# Patient Record
Sex: FEMALE | Race: WHITE
[De-identification: names, ages, dates, MRNs, and addresses within clinical notes are randomized per-mention and may not be internally consistent; named-entity substitution may affect disease eponyms.]

---

## 2017-04-23 ENCOUNTER — HOSPITAL ENCOUNTER (EMERGENCY)
Dept: HOSPITAL 62 - ER | Age: 37
Discharge: HOME | End: 2017-04-23
Payer: MEDICAID

## 2017-04-23 VITALS — DIASTOLIC BLOOD PRESSURE: 110 MMHG | SYSTOLIC BLOOD PRESSURE: 166 MMHG

## 2017-04-23 DIAGNOSIS — M25.512: ICD-10-CM

## 2017-04-23 DIAGNOSIS — R07.89: Primary | ICD-10-CM

## 2017-04-23 DIAGNOSIS — I10: ICD-10-CM

## 2017-04-23 DIAGNOSIS — L55.9: ICD-10-CM

## 2017-04-23 DIAGNOSIS — M25.511: ICD-10-CM

## 2017-04-23 DIAGNOSIS — M54.89: ICD-10-CM

## 2017-04-23 LAB
ALBUMIN SERPL-MCNC: 4.5 G/DL (ref 3.5–5)
ALP SERPL-CCNC: 66 U/L (ref 38–126)
ALT SERPL-CCNC: 30 U/L (ref 9–52)
ANION GAP SERPL CALC-SCNC: 14 MMOL/L (ref 5–19)
AST SERPL-CCNC: 24 U/L (ref 14–36)
BASOPHILS # BLD AUTO: 0 10^3/UL (ref 0–0.2)
BASOPHILS NFR BLD AUTO: 0.5 % (ref 0–2)
BILIRUB DIRECT SERPL-MCNC: 0.3 MG/DL (ref 0–0.4)
BILIRUB SERPL-MCNC: 0.9 MG/DL (ref 0.2–1.3)
BUN SERPL-MCNC: 9 MG/DL (ref 7–20)
CALCIUM: 9.1 MG/DL (ref 8.4–10.2)
CHLORIDE SERPL-SCNC: 103 MMOL/L (ref 98–107)
CK MB SERPL-MCNC: 0.79 NG/ML (ref ?–4.55)
CK SERPL-CCNC: 79 U/L (ref 30–135)
CO2 SERPL-SCNC: 25 MMOL/L (ref 22–30)
CREAT SERPL-MCNC: 0.55 MG/DL (ref 0.52–1.25)
EOSINOPHIL # BLD AUTO: 0.1 10^3/UL (ref 0–0.6)
EOSINOPHIL NFR BLD AUTO: 1.1 % (ref 0–6)
ERYTHROCYTE [DISTWIDTH] IN BLOOD BY AUTOMATED COUNT: 14 % (ref 11.5–14)
GLUCOSE SERPL-MCNC: 102 MG/DL (ref 75–110)
HCT VFR BLD CALC: 41.6 % (ref 36–47)
HGB BLD-MCNC: 14.4 G/DL (ref 12–15.5)
HGB HCT DIFFERENCE: 1.6
LYMPHOCYTES # BLD AUTO: 2.8 10^3/UL (ref 0.5–4.7)
LYMPHOCYTES NFR BLD AUTO: 27.3 % (ref 13–45)
MCH RBC QN AUTO: 28.8 PG (ref 27–33.4)
MCHC RBC AUTO-ENTMCNC: 34.6 G/DL (ref 32–36)
MCV RBC AUTO: 83 FL (ref 80–97)
MONOCYTES # BLD AUTO: 0.9 10^3/UL (ref 0.1–1.4)
MONOCYTES NFR BLD AUTO: 8.7 % (ref 3–13)
NEUTROPHILS # BLD AUTO: 6.3 10^3/UL (ref 1.7–8.2)
NEUTS SEG NFR BLD AUTO: 62.4 % (ref 42–78)
POTASSIUM SERPL-SCNC: 4.1 MMOL/L (ref 3.6–5)
PROT SERPL-MCNC: 7.6 G/DL (ref 6.3–8.2)
RBC # BLD AUTO: 4.99 10^6/UL (ref 3.72–5.28)
SODIUM SERPL-SCNC: 142.4 MMOL/L (ref 137–145)
TROPONIN I SERPL-MCNC: < 0.012 NG/ML
WBC # BLD AUTO: 10.2 10^3/UL (ref 4–10.5)

## 2017-04-23 PROCEDURE — 85025 COMPLETE CBC W/AUTO DIFF WBC: CPT

## 2017-04-23 PROCEDURE — 71020: CPT

## 2017-04-23 PROCEDURE — 84484 ASSAY OF TROPONIN QUANT: CPT

## 2017-04-23 PROCEDURE — 82553 CREATINE MB FRACTION: CPT

## 2017-04-23 PROCEDURE — 36415 COLL VENOUS BLD VENIPUNCTURE: CPT

## 2017-04-23 PROCEDURE — 93005 ELECTROCARDIOGRAM TRACING: CPT

## 2017-04-23 PROCEDURE — 82550 ASSAY OF CK (CPK): CPT

## 2017-04-23 PROCEDURE — 99285 EMERGENCY DEPT VISIT HI MDM: CPT

## 2017-04-23 PROCEDURE — 93010 ELECTROCARDIOGRAM REPORT: CPT

## 2017-04-23 PROCEDURE — 80053 COMPREHEN METABOLIC PANEL: CPT

## 2017-04-23 NOTE — ER DOCUMENT REPORT
ED Medical Screen (RME)





- General


Chief Complaint: Blood Pressure Problem


Stated Complaint: SORNESS IN SHOULDER AND CHEST


Time seen by provider: 11:13


Mode of Arrival: Ambulatory


Information source: Patient


Notes: 


36-year-old nonsmoker hypertensive patient went to the urgent care today 

because she has been having chest upper back bilateral shoulders and bilateral 

arm stiffness and soreness is worse with movement.  She first felt it Saturday 

morning and it lasted 2 hours.  She again felt it at 0200 it woke her up, she 

went to urgent care about this and they sent her to the emergency room because 

she had high blood pressure.  She has been out of her lisinopril 10 mg for 4 

days and did not have a refill.


TRAVEL OUTSIDE OF THE [place] IN LAST 30 DAYS: No





- Related Data


Allergies/Adverse Reactions: 


 





No Known Allergies Allergy (Verified 04/23/17 10:58)


 











Past Medical History


Renal/ Medical History: Denies: Hx Peritoneal Dialysis





- Immunizations


Hx Diphtheria, Pertussis, Tetanus Vaccination: No





Physical Exam





- Vital signs


Vitals: 





 











Temp Pulse Resp BP Pulse Ox


 


 98.1 F   100   18   192/117 H  100 


 


 04/23/17 10:57  04/23/17 10:57  04/23/17 10:57  04/23/17 10:57  04/23/17 10:57














Course





- Vital Signs


Vital signs: 





 











Temp Pulse Resp BP Pulse Ox


 


 98.1 F   100   18   192/117 H  100 


 


 04/23/17 10:57  04/23/17 10:57  04/23/17 10:57  04/23/17 10:57  04/23/17 10:57

## 2017-04-23 NOTE — ER DOCUMENT REPORT
ED General





- General


Time seen by provider: 11:40


Mode of Arrival: Ambulatory


Information source: Patient


TRAVEL OUTSIDE OF THE [place] IN LAST 30 DAYS: No





- HPI


Onset: Other - see HPI note


Similar symptoms previously: No


Recently seen / treated by doctor: No





<DRU GILMAN - Last Filed: 04/23/17 15:36>





<SALINAS LOPEZ - Last Filed: 04/23/17 16:41>





<SALINAS QUINTEROS - Last Filed: 05/01/17 02:09>





- General


Chief Complaint: Blood Pressure Problem


Stated Complaint: SORNESS IN SHOULDER AND CHEST


Notes: 


Patient is a 36-year-old female presenting to the emergency department for pain 

to her shoulders bilaterally.  Patient also complains of pain in her chest, 

upper back and states that her pain is worse with movement.  Patient states she 

is very stiff.  Patient states that she takes care of her 50 pound 4-year-old 

special needs.  Patient states she also is out of her blood pressure medications

; patient takes 10 mg of lisinopril.  Patient's primary care physician is the 

[facility] and patient states that she last saw them in December but 

did not get her medications refilled.  Patient has no known allergies.








 (DRU GILMAN)





- Related Data


Allergies/Adverse Reactions: 


 





No Known Allergies Allergy (Verified 04/23/17 10:58)


 











Past Medical History





- General


Information source: Patient





- Social History


Smoking Status: Unknown if Ever Smoked


Family History: None


Patient has suicidal ideation: No


Patient has homicidal ideation: No





- Past Medical History


Cardiac Medical History: Reports: Hx Hypertension





- Immunizations


Hx Diphtheria, Pertussis, Tetanus Vaccination: No





<DRU GILMAN - Last Filed: 04/23/17 15:36>





Review of Systems





- Review of Systems


Constitutional: No symptoms reported


EENT: No symptoms reported


Cardiovascular: See HPI, Chest pain


Respiratory: No symptoms reported


Gastrointestinal: No symptoms reported


Genitourinary: No symptoms reported


Female Genitourinary: No symptoms reported


Musculoskeletal: See HPI, Back pain, Joint pain, Muscle pain


Skin: No symptoms reported


Hematologic/Lymphatic: No symptoms reported


Neurological/Psychological: No symptoms reported


-: Yes All other systems reviewed and negative





<DRU GILMAN - Last Filed: 04/23/17 15:36>





Physical Exam





- Vital signs


Interpretation: Hypertensive





- General


General appearance: Appears well, Alert


In distress: Mild





- HEENT


Head: Normocephalic, Atraumatic


Eyes: Normal


Pupils: PERRL


Mucous membranes: Moist





- Respiratory


Respiratory status: No respiratory distress


Chest status: Nontender


Breath sounds: Normal


Chest palpation: Normal





- Cardiovascular


Rhythm: Regular


Heart sounds: Normal auscultation


Murmur: No





- Abdominal


Inspection: Normal


Distension: No distension


Bowel sounds: Normal


Tenderness: Nontender


Organomegaly: No organomegaly





- Back


Back: Normal, Nontender





- Extremities


General upper extremity: Other - Increased pain to the shoulders bilaterally 

when lifting arms


General lower extremity: Normal inspection, Normal ROM, Normal strength





- Neurological


Neuro grossly intact: Yes


Cognition: Normal


Orientation: AAOx4


[place] Coma Scale Eye Opening: Spontaneous


[place] Coma Scale Verbal: Oriented


[place] Coma Scale Motor: Obeys Commands


[place] Coma Scale Total: 15


Speech: Normal


Sensory: Normal





- Psychological


Associated symptoms: Normal affect, Normal mood





- Skin


Skin Temperature: Warm


Skin Moisture: Dry


Skin Color: Other - Sunburnt





<DRU GILMAN - Last Filed: 04/23/17 15:36>





<SALINAS LOPEZ - Last Filed: 04/23/17 16:41>





<SALINAS QUINTEROS - Last Filed: 05/01/17 02:09>





- Vital signs


Vitals: 





 











Temp Pulse Resp BP Pulse Ox


 


 98.1 F   100   18   192/117 H  100 


 


 04/23/17 10:57  04/23/17 10:57  04/23/17 10:57  04/23/17 10:57  04/23/17 10:57














Course





- Laboratory


Result Diagrams: 


 04/23/17 11:20





 04/23/17 11:20





<DRU GILMAN - Last Filed: 04/23/17 15:36>





- Laboratory


Result Diagrams: 


 04/23/17 11:20





 04/23/17 11:20





<SALINAS LOPEZ - Last Filed: 04/23/17 16:41>





- Laboratory


Result Diagrams: 


 04/23/17 11:20





 04/23/17 11:20





<SALINAS QUINTEROS - Last Filed: 05/01/17 02:09>





- Vital Signs


Vital signs: 





 











Temp Pulse Resp BP Pulse Ox


 


 98.1 F   100   11 L  166/110 H  100 


 


 04/23/17 10:57  04/23/17 10:57  04/23/17 16:48  04/23/17 16:49  04/23/17 16:49














Discharge





<DRU GILMAN - Last Filed: 04/23/17 15:36>





<SALINAS LOPEZ - Last Filed: 04/23/17 16:41>





<NELISALINAS - Last Filed: 05/01/17 02:09>





- Discharge


Clinical Impression: 


 Musculoskeletal chest pain





Hypertension


Qualifiers:


 Hypertension type: other secondary hypertension Qualified Code(s): I15.8 - 

Other secondary hypertension





Condition: Stable


Disposition: HOME, SELF-CARE


Additional Instructions: 


CHEST PAIN OF UNCLEAR CAUSE:





     The exact cause of your chest pain isn't clear.  Fortunately, there is no 

evidence of a dangerous medical condition.  Further testing may be required to 

find the source of the pain.


     Most often, we find that this pain is coming from the chest wall -- the 

muscles or rib joints in the chest.  But chest pain can come from the lung and 

lung lining, the esophagus, the heart valves or heart lining, and even the 

stomach or gallbladder.


     Rest.  Eat lightly until the pain is gone.  We may prescribe medicine for 

pain and inflammation.


     You should call the physician immediately if the pain radiates to the 

shoulder, jaw or arms; if you start to run a fever or develop a cough; or if 

you develop shortness of breath, or other new or alarming symptoms.








NORMAL EXAM AND WORKUP:


     At this time, your examination and workup show no significant abnormality.

  No significant abnormal physical findings were noted.  All laboratory, EKG, 

and imaging (x-ray, CT scans, ultrasound) studies that were ordered show no 

significant abnormality.


     Although your examination and all studies that were ordered showed no 

significant abnormal finding, there are no examinations and no studies that are 

100% accurate.  There is always the possibility that some abnormality could 

exist and not be detected with physical examination or within the limits and 

capabilities of laboratory and other studies.


     You should return or follow up as you were instructed on your visit today 

for further evaluation if your symptoms do not resolve.








CHEST WALL PAIN:


     Your chest pain may be coming from the chest wall. This is often caused by 

straining the muscles or joints in the chest during physical activity, direct 

trauma, coughing, or vigorous vomiting.  Persons with arthritis are especially 

prone to this type of pain, due to inflammation of the cartilage joints near 

the breast bone. Occasionally, no cause can be found.


     Rest from strenuous physical activity.  This kind of chest pain is usually 

made worse by movement of the chest.  Depending on the symptoms, we may 

prescribe medicine for pain, muscle relaxation, and antiinflammatory effects.


     If the pain is new, and seems to be due to muscle strain, cold packs can 

help.  Otherwise, apply gentle warmth to the painful area for 15 minutes every 

hour or two.


     You should call contact the doctor immediately if things change. Further 

evaluation is needed if you develop a fever or cough, if the nature of the pain 

changes, or if you become short of breath.








High Blood Pressure





   When your blood pressure was taken today it was elevated.  





   Pre-hypertension/Hypertension: The patient has been informed that they may 

have pre-hypertension or Hypertension based on a blood pressure reading in the 

emergency department. I recommend that the patient call the primary care 

provider listed on their dischargge instructions or a physician of their choice 

this wee to arrage follow up for further evaluation of possible pre-

hypertension or Hypertension.





   Sometimes, stress or illness causes a temporary elevation of your blood 

pressure.  We suggest that you get your blood pressure measured three more 

times during the next few days to see if this is more than a temporary 

abnormality.  If your blood pressure is greater than 150/90 on each occasion, 

you must have treatment.





   Some simple things you can do to help are: If you have blood pressure 

medicine but aren't using it regularly, start taking it again. Get some aerobic 

exercise for at least 20 minutes on a daily basis. (See your doctor before 

beginning a new exercise program.) Eat a low-fat diet. Lose excess weight.  

Avoid salty foods and avoid adding salt to any of the foods you eat.  Avoid 

diet pills, decongestants, "energizing" herbs, and other medicines that elevate 

blood pressure.


   


   If left untreated, hypertension greatly enhances your risk for developing 

heart disease and strokes.  Please don't ignore this problem.


     








FOLLOW-UP CARE:


If you have been referred to a physician for follow-up care, call the physician

s office for an appointment as you were instructed or within the next two days.

  If you experience worsening or a significant change in your symptoms, notify 

the physician immediately or return to the Emergency Department at any time for 

re-evaluation.








Keep your primary care follow-up on Friday as already scheduled.  Resume your 

daily blood pressure medications as discussed.  Return to ER for worsening 

symptoms or concerns.


Prescriptions: 


Lisinopril 10 mg PO DAILY #14 tablet


Referrals: 


TEOFILO YUNG MD [Primary Care Provider] - Follow up in 1 week


Scribe Attestation: 





05/01/17 02:08


 i personally performed the services described in the documentation reviewed 

the documentation reported by my strife in my presence and accurately and 

completely records my words and actions (SALINAS QUINTEROS)





Scribe Documentation





- Scribe


Written by Scribe:: Dru Gilman 4/23/17 15:45


acting as scribe for :: Neli





<DRU GILMAN - Last Filed: 04/23/17 15:36>

## 2017-04-23 NOTE — EKG REPORT
SEVERITY:- BORDERLINE ECG -

SINUS TACHYCARDIA

BORDERLINE IVCD WITH LAD

:

Confirmed by: Elaine Sanchez MD 23-Apr-2017 16:46:25

## 2020-08-07 ENCOUNTER — HOSPITAL ENCOUNTER (OUTPATIENT)
Dept: HOSPITAL 62 - OD | Age: 40
End: 2020-08-07
Attending: NURSE PRACTITIONER
Payer: COMMERCIAL

## 2020-08-07 DIAGNOSIS — M54.6: Primary | ICD-10-CM

## 2020-08-07 DIAGNOSIS — M54.5: ICD-10-CM

## 2020-08-07 PROCEDURE — 72110 X-RAY EXAM L-2 SPINE 4/>VWS: CPT

## 2020-08-07 PROCEDURE — 72070 X-RAY EXAM THORAC SPINE 2VWS: CPT

## 2020-08-07 NOTE — RADIOLOGY REPORT (SQ)
EXAM DESCRIPTION:  LUMBAR SPINE COMPLETE



IMAGES COMPLETED DATE/TIME:  8/7/2020 12:58 pm



REASON FOR STUDY:  LUMBAR AND ACUTE BILATERAL THORACIC PAIN M54.6  PAIN IN THORACIC SPINE M54.5  LOW 
BACK PAIN



COMPARISON:  None.



NUMBER OF VIEWS:  Five views including obliques.



TECHNIQUE:  AP, lateral, oblique, and sacral radiographic images acquired of the lumbar spine.



LIMITATIONS:  None.



FINDINGS:  MINERALIZATION: Normal.

SEGMENTATION: Normal.  No transitional anatomy.

ALIGNMENT: Normal.

VERTEBRAE: Maintained height.  No fracture or worrisome bone lesion.

DISCS: Preserved height.  No significant osteophytes or end plate irregularity.

POSTERIOR ELEMENTS: Pedicles and facets are intact.  No pars defect or posterior arch defects.

HARDWARE: None in the spine.

PARASPINAL SOFT TISSUES: Normal.

PELVIS: Intact as visualized. No fractures or worrisome bone lesions. SI joints intact.

OTHER: No other significant finding.



IMPRESSION:  NORMAL 5 VIEW LUMBAR SPINE.



TECHNICAL DOCUMENTATION:  JOB ID:  [id number]

 2011 [employer]- All Rights Reserved



Reading location - IP/workstation name: [email or web address]

## 2020-08-07 NOTE — RADIOLOGY REPORT (SQ)
EXAM DESCRIPTION:  T SPINE AP/LAT



IMAGES COMPLETED DATE/TIME:  8/7/2020 12:58 pm



REASON FOR STUDY:  LUMBAR AND ACUTE BILATERAL THORACIC PAIN M54.6  PAIN IN THORACIC SPINE M54.5  LOW 
BACK PAIN



COMPARISON:  None.



NUMBER OF VIEWS:  Two views.



TECHNIQUE:  AP and lateral radiographic images acquired of the thoracic spine.



LIMITATIONS:  None.



FINDINGS:  MINERALIZATION: Normal.

ALIGNMENT: Normal.  No scoliosis.

VERTEBRAE: No fracture or bone lesion.  Maintained height, normal segmentation.

DISCS: No significant loss of height or significant narrowing.  No large osteophytes.

HARDWARE: None in the spine.

MEDIASTINUM AND SOFT TISSUES: Normal heart size and aortic contour.  No soft tissue abnormality.

VISUALIZED LUNG FIELDS: Clear.

OTHER: No other significant finding.



IMPRESSION:  NO SIGNIFICANT RADIOGRAPHIC FINDING IN THE THORACIC SPINE.



TECHNICAL DOCUMENTATION:  JOB ID:  [id number]

 2011 [employer]- All Rights Reserved



Reading location - IP/workstation name: [email or web address]